# Patient Record
Sex: MALE | Race: WHITE | NOT HISPANIC OR LATINO | URBAN - METROPOLITAN AREA
[De-identification: names, ages, dates, MRNs, and addresses within clinical notes are randomized per-mention and may not be internally consistent; named-entity substitution may affect disease eponyms.]

---

## 2017-03-01 ENCOUNTER — INPATIENT (INPATIENT)
Facility: HOSPITAL | Age: 62
LOS: 5 days | Discharge: NURSING, REHAB OR LONG-TERM CARE | DRG: 470 | End: 2017-03-07
Payer: COMMERCIAL

## 2017-03-01 VITALS
SYSTOLIC BLOOD PRESSURE: 136 MMHG | WEIGHT: 253.31 LBS | OXYGEN SATURATION: 99 % | HEART RATE: 67 BPM | DIASTOLIC BLOOD PRESSURE: 70 MMHG | RESPIRATION RATE: 16 BRPM | TEMPERATURE: 98 F | HEIGHT: 71 IN

## 2017-03-01 DIAGNOSIS — S89.90XA UNSPECIFIED INJURY OF UNSPECIFIED LOWER LEG, INITIAL ENCOUNTER: Chronic | ICD-10-CM

## 2017-03-01 DIAGNOSIS — Z90.89 ACQUIRED ABSENCE OF OTHER ORGANS: Chronic | ICD-10-CM

## 2017-03-01 DIAGNOSIS — Z98.890 OTHER SPECIFIED POSTPROCEDURAL STATES: Chronic | ICD-10-CM

## 2017-03-01 DIAGNOSIS — M25.319 OTHER INSTABILITY, UNSPECIFIED SHOULDER: Chronic | ICD-10-CM

## 2017-03-01 DIAGNOSIS — M25.839 OTHER SPECIFIED JOINT DISORDERS, UNSPECIFIED WRIST: Chronic | ICD-10-CM

## 2017-03-01 DIAGNOSIS — M19.90 UNSPECIFIED OSTEOARTHRITIS, UNSPECIFIED SITE: ICD-10-CM

## 2017-03-01 PROCEDURE — 73560 X-RAY EXAM OF KNEE 1 OR 2: CPT | Mod: 26,RT

## 2017-03-01 RX ORDER — MAGNESIUM HYDROXIDE 400 MG/1
30 TABLET, CHEWABLE ORAL DAILY
Qty: 0 | Refills: 0 | Status: DISCONTINUED | OUTPATIENT
Start: 2017-03-01 | End: 2017-03-07

## 2017-03-01 RX ORDER — NEBIVOLOL HYDROCHLORIDE 5 MG/1
1 TABLET ORAL
Qty: 0 | Refills: 0 | COMMUNITY

## 2017-03-01 RX ORDER — SENNA PLUS 8.6 MG/1
2 TABLET ORAL AT BEDTIME
Qty: 0 | Refills: 0 | Status: DISCONTINUED | OUTPATIENT
Start: 2017-03-01 | End: 2017-03-07

## 2017-03-01 RX ORDER — BUPIVACAINE 13.3 MG/ML
20 INJECTION, SUSPENSION, LIPOSOMAL INFILTRATION ONCE
Qty: 0 | Refills: 0 | Status: DISCONTINUED | OUTPATIENT
Start: 2017-03-01 | End: 2017-03-01

## 2017-03-01 RX ORDER — NEBIVOLOL HYDROCHLORIDE 5 MG/1
5 TABLET ORAL DAILY
Qty: 0 | Refills: 0 | Status: DISCONTINUED | OUTPATIENT
Start: 2017-03-01 | End: 2017-03-05

## 2017-03-01 RX ORDER — OXYCODONE HYDROCHLORIDE 5 MG/1
10 TABLET ORAL EVERY 4 HOURS
Qty: 0 | Refills: 0 | Status: DISCONTINUED | OUTPATIENT
Start: 2017-03-01 | End: 2017-03-03

## 2017-03-01 RX ORDER — OXYCODONE HYDROCHLORIDE 5 MG/1
5 TABLET ORAL EVERY 4 HOURS
Qty: 0 | Refills: 0 | Status: DISCONTINUED | OUTPATIENT
Start: 2017-03-01 | End: 2017-03-03

## 2017-03-01 RX ORDER — BUPIVACAINE 13.3 MG/ML
20 INJECTION, SUSPENSION, LIPOSOMAL INFILTRATION ONCE
Qty: 0 | Refills: 0 | Status: DISCONTINUED | OUTPATIENT
Start: 2017-03-01 | End: 2017-03-04

## 2017-03-01 RX ORDER — ONDANSETRON 8 MG/1
4 TABLET, FILM COATED ORAL EVERY 6 HOURS
Qty: 0 | Refills: 0 | Status: DISCONTINUED | OUTPATIENT
Start: 2017-03-01 | End: 2017-03-06

## 2017-03-01 RX ORDER — MORPHINE SULFATE 50 MG/1
4 CAPSULE, EXTENDED RELEASE ORAL
Qty: 0 | Refills: 0 | Status: DISCONTINUED | OUTPATIENT
Start: 2017-03-01 | End: 2017-03-06

## 2017-03-01 RX ORDER — DEXLANSOPRAZOLE 30 MG/1
1 CAPSULE, DELAYED RELEASE ORAL
Qty: 0 | Refills: 0 | COMMUNITY

## 2017-03-01 RX ORDER — ASPIRIN/CALCIUM CARB/MAGNESIUM 324 MG
325 TABLET ORAL
Qty: 0 | Refills: 0 | Status: DISCONTINUED | OUTPATIENT
Start: 2017-03-01 | End: 2017-03-07

## 2017-03-01 RX ORDER — OXYCODONE HYDROCHLORIDE 5 MG/1
10 TABLET ORAL EVERY 12 HOURS
Qty: 0 | Refills: 0 | Status: DISCONTINUED | OUTPATIENT
Start: 2017-03-01 | End: 2017-03-02

## 2017-03-01 RX ORDER — ACETAMINOPHEN 500 MG
650 TABLET ORAL EVERY 6 HOURS
Qty: 0 | Refills: 0 | Status: DISCONTINUED | OUTPATIENT
Start: 2017-03-01 | End: 2017-03-06

## 2017-03-01 RX ORDER — POLYETHYLENE GLYCOL 3350 17 G/17G
17 POWDER, FOR SOLUTION ORAL DAILY
Qty: 0 | Refills: 0 | Status: DISCONTINUED | OUTPATIENT
Start: 2017-03-01 | End: 2017-03-07

## 2017-03-01 RX ORDER — CELECOXIB 200 MG/1
200 CAPSULE ORAL
Qty: 0 | Refills: 0 | Status: DISCONTINUED | OUTPATIENT
Start: 2017-03-03 | End: 2017-03-06

## 2017-03-01 RX ORDER — MORPHINE SULFATE 50 MG/1
4 CAPSULE, EXTENDED RELEASE ORAL EVERY 4 HOURS
Qty: 0 | Refills: 0 | Status: DISCONTINUED | OUTPATIENT
Start: 2017-03-01 | End: 2017-03-06

## 2017-03-01 RX ORDER — SODIUM CHLORIDE 9 MG/ML
1000 INJECTION, SOLUTION INTRAVENOUS
Qty: 0 | Refills: 0 | Status: DISCONTINUED | OUTPATIENT
Start: 2017-03-01 | End: 2017-03-06

## 2017-03-01 RX ORDER — KETOROLAC TROMETHAMINE 30 MG/ML
30 SYRINGE (ML) INJECTION EVERY 6 HOURS
Qty: 0 | Refills: 0 | Status: DISCONTINUED | OUTPATIENT
Start: 2017-03-01 | End: 2017-03-01

## 2017-03-01 RX ORDER — CEFAZOLIN SODIUM 1 G
2000 VIAL (EA) INJECTION EVERY 8 HOURS
Qty: 0 | Refills: 0 | Status: COMPLETED | OUTPATIENT
Start: 2017-03-01 | End: 2017-03-02

## 2017-03-01 RX ORDER — DOCUSATE SODIUM 100 MG
100 CAPSULE ORAL THREE TIMES A DAY
Qty: 0 | Refills: 0 | Status: DISCONTINUED | OUTPATIENT
Start: 2017-03-01 | End: 2017-03-07

## 2017-03-01 RX ADMIN — Medication 100 MILLIGRAM(S): at 18:02

## 2017-03-01 RX ADMIN — OXYCODONE HYDROCHLORIDE 10 MILLIGRAM(S): 5 TABLET ORAL at 20:00

## 2017-03-01 RX ADMIN — OXYCODONE HYDROCHLORIDE 10 MILLIGRAM(S): 5 TABLET ORAL at 15:18

## 2017-03-01 RX ADMIN — OXYCODONE HYDROCHLORIDE 10 MILLIGRAM(S): 5 TABLET ORAL at 17:59

## 2017-03-01 RX ADMIN — OXYCODONE HYDROCHLORIDE 10 MILLIGRAM(S): 5 TABLET ORAL at 23:54

## 2017-03-01 RX ADMIN — OXYCODONE HYDROCHLORIDE 10 MILLIGRAM(S): 5 TABLET ORAL at 23:24

## 2017-03-01 RX ADMIN — SODIUM CHLORIDE 140 MILLILITER(S): 9 INJECTION, SOLUTION INTRAVENOUS at 14:56

## 2017-03-01 RX ADMIN — MORPHINE SULFATE 4 MILLIGRAM(S): 50 CAPSULE, EXTENDED RELEASE ORAL at 17:00

## 2017-03-01 RX ADMIN — Medication 30 MILLIGRAM(S): at 23:54

## 2017-03-01 RX ADMIN — Medication 30 MILLIGRAM(S): at 17:57

## 2017-03-01 RX ADMIN — NEBIVOLOL HYDROCHLORIDE 5 MILLIGRAM(S): 5 TABLET ORAL at 21:58

## 2017-03-01 RX ADMIN — MORPHINE SULFATE 4 MILLIGRAM(S): 50 CAPSULE, EXTENDED RELEASE ORAL at 16:27

## 2017-03-01 RX ADMIN — OXYCODONE HYDROCHLORIDE 10 MILLIGRAM(S): 5 TABLET ORAL at 16:18

## 2017-03-01 RX ADMIN — OXYCODONE HYDROCHLORIDE 10 MILLIGRAM(S): 5 TABLET ORAL at 18:25

## 2017-03-01 RX ADMIN — Medication 100 MILLIGRAM(S): at 17:58

## 2017-03-01 RX ADMIN — Medication 325 MILLIGRAM(S): at 17:57

## 2017-03-01 RX ADMIN — Medication 30 MILLIGRAM(S): at 18:25

## 2017-03-01 RX ADMIN — POLYETHYLENE GLYCOL 3350 17 GRAM(S): 17 POWDER, FOR SOLUTION ORAL at 17:57

## 2017-03-01 RX ADMIN — Medication 30 MILLIGRAM(S): at 23:24

## 2017-03-01 RX ADMIN — OXYCODONE HYDROCHLORIDE 10 MILLIGRAM(S): 5 TABLET ORAL at 19:28

## 2017-03-01 NOTE — DISCHARGE NOTE ADULT - PATIENT PORTAL LINK FT
“You can access the [employer] Patient Portal, offered by [facility], by registering with the following website: [email or web address]”

## 2017-03-01 NOTE — DISCHARGE NOTE ADULT - ADDITIONAL INSTRUCTIONS
No strenuous activity, heavy lifting, driving, tub bathing, or returning to work until cleared by MD.  You may shower--dressing is waterproof.  Remove dressing after post op day 7, then leave incision open to air.  Follow up with Dr. Beckman in his office in 2 weeks from surgery.  Any staples/sutures will be removed in 2 weeks.  If you don't have a bowel movement by post op day 3, then take Milk of Magnesia (over the counter).  If no bowel movement by at least post op day 5, then use a Dulcolax suppository (over the counter) and/or a Fleets enema--if still no bowel movement, call your MD.  Contact your doctor if you experience: fever greater than 101.5, chills, chest pain, difficulty breathing, bleeding, redness or heat around the incision.    Please follow up with your primary care provider. Weight bearing as tolerated on right leg with assistive device.  No strenuous activity, heavy lifting, driving, tub bathing, or returning to work until cleared by MD.  You may shower--dressing is waterproof.  Remove dressing after post op day 7, then leave incision open to air.  Follow up with Dr. Beckman in his office in 2 weeks from surgery.  Any staples/sutures will be removed in 2 weeks.  If you don't have a bowel movement by post op day 3, then take Milk of Magnesia (over the counter).  If no bowel movement by at least post op day 5, then use a Dulcolax suppository (over the counter) and/or a Fleets enema--if still no bowel movement, call your MD.  Contact your doctor if you experience: fever greater than 101.5, chills, chest pain, difficulty breathing, bleeding, redness or heat around the incision.    Please follow up with your primary care provider. Weight bearing as tolerated on right leg with assistive device.  No strenuous activity, heavy lifting, driving, tub bathing, or returning to work until cleared by MD.  You may shower--dressing is waterproof.  Remove dressing after post op day 7, then leave incision open to air.  Follow up with Dr. Beckman in his office in 2 weeks from surgery.  Any staples/sutures will be removed in 2 weeks.  If you don't have a bowel movement by post op day 3, then take Milk of Magnesia (over the counter).  If no bowel movement by at least post op day 5, then use a Dulcolax suppository (over the counter) and/or a Fleets enema--if still no bowel movement, call your MD.  Contact your doctor if you experience: fever greater than 101.5, chills, chest pain, difficulty breathing, bleeding, redness or heat around the incision.    Please follow up with your primary care provider.  Follow up with your cardiologist for blood pressure management within one week of discharge.

## 2017-03-01 NOTE — DISCHARGE NOTE ADULT - HOSPITAL COURSE
Admitted  Surgery - R TKA 3/1/17  Perioperative abx  Pain control  DVT ppx  PT consult Admitted 3/1/17  Surgery - R TKA 3/1/17  Perioperative abx  Pain control  DVT ppx  PT consult

## 2017-03-01 NOTE — H&P ADULT - NSHPLABSRESULTS_GEN_ALL_CORE
Preop CBC, BMP, PT/PTT/INR, UA within normal limits- reviewed by medical clearance.   CXR: Within normal limits, per medical clearance.   Preop EKG: SR first degree AV block, reviewed by medical clearance.

## 2017-03-01 NOTE — PATIENT PROFILE ADULT. - PMH
Acid reflux disease    Hypertension    Polymyalgia rheumatica syndrome Acid reflux disease    Hypertension    Involuntary movements  tremors of hands.  Polymyalgia rheumatica syndrome

## 2017-03-01 NOTE — PROGRESS NOTE ADULT - SUBJECTIVE AND OBJECTIVE BOX
Orthopedics Post Op Check    Procedure: R TKA  Surgeon: Rk GARCIAS: Patient seen and examined. Pain reported but controlled. No acute concerns.  Denies CP, SOB, N/V, numbness/tingling      O: AFVSS  Motor: 5/5 GS/TA/EHL/FHL BL   SILT to patients baseline BL  WWP DP PT BL  Dressing C/D/I     Radiograph: Prosthesis in place, well positioned. No osseous defect evident.

## 2017-03-01 NOTE — DISCHARGE NOTE ADULT - CARE PROVIDER_API CALL
Samuel Beckman), Orthopaedic Surgery  [address] [address]  Phone: [phone]  Fax: [phone] Sameul Beckman), Orthopaedic Surgery  [address] [place] [address]  Phone: [phone]  Fax: [phone]    Bessie Lees), Internal Medicine  [address] [place] [address]  Phone: [phone]  Fax: [phone]    Kait Powell), Critical Care Medicine; Pulmonary Disease  [address] [place] [address]  Phone: [phone]  Fax: [phone]

## 2017-03-01 NOTE — PATIENT PROFILE ADULT. - PSH
H/O arthroscopy of right knee    History of tonsillectomy    Knee injuries  right knee microfracture  Shoulder instability  belateral shoulder stabalization  Wrist impingement syndrome  right vein released

## 2017-03-01 NOTE — H&P ADULT - PROBLEM SELECTOR PLAN 1
Admit to Orthopaedic Service.  Presents today for elective right TKA  Pt medically stable and cleared for procedure today by Dr. Thomas

## 2017-03-01 NOTE — PROGRESS NOTE ADULT - ASSESSMENT
A/P: 61y Male s/p R TKA  - Stable  - Pain Control  - DVT ppx: ASA  - Post op abx:  - PT/WBAT  - F/U Labs

## 2017-03-01 NOTE — DISCHARGE NOTE ADULT - MEDICATION SUMMARY - MEDICATIONS TO TAKE
I will START or STAY ON the medications listed below when I get home from the hospital:    predniSONE 1 mg oral tablet  -- 2 tab(s) by mouth once a day  -- Indication: For PMR    acetaminophen 325 mg oral tablet  -- 3 tab(s) by mouth every 8 hours, As needed, Mild Pain (1 - 3)  -- Indication: For mild pain    aspirin 325 mg oral delayed release tablet  -- 1 tab(s) by mouth 2 times a day  -- Indication: For Prevent blood clots    acetaminophen-HYDROcodone 325 mg-5 mg oral tablet  -- 1 tab(s) by mouth every 6 hours, As needed, Moderate Pain (4 - 6)  -- Indication: For moderate to severe pain    ondansetron 4 mg oral tablet  -- 1 tab(s) by mouth every 8 hours  -- Indication: For nausea, as needed    scopolamine 1.5 mg transdermal film, extended release  -- 1 patch by transdermal patch every 72 hours  -- For external use only.  May cause drowsiness.  Alcohol may intensify this effect.  Use care when operating dangerous machinery.  Remove old patch prior to applying a new patch.    -- Indication: For nausea, as needed    Bystolic 5 mg oral tablet  -- 1 tab(s) by mouth once a day  -- Indication: For Hypertension    bisacodyl 10 mg rectal suppository  -- 1 suppository(ies) rectally once a day, As needed, If no bowel movement by postoperative day #2  -- Indication: For constipation    docusate sodium 100 mg oral capsule  -- 1 cap(s) by mouth 3 times a day  -- Indication: For constipation    polyethylene glycol 3350 oral powder for reconstitution  -- 17 gram(s) by mouth once a day  -- Indication: For constipation    senna oral tablet  -- 2 tab(s) by mouth once a day (at bedtime), As needed, Constipation  -- Indication: For constipation    Dexilant 60 mg oral delayed release capsule  -- 1 cap(s) by mouth once a day  -- Indication: For Home gastrointestinal agent

## 2017-03-01 NOTE — PHYSICAL THERAPY INITIAL EVALUATION ADULT - RANGE OF MOTION EXAMINATION, REHAB EVAL
deficits as listed below/bilateral upper extremity ROM was WFL (within functional limits)/Left LE ROM was WFL (within functional limits)/Right knee ROM: 0-75 limited by pain

## 2017-03-01 NOTE — H&P ADULT - PMH
Acid reflux disease    Hypertension    Involuntary movements  tremors of hands.  Polymyalgia rheumatica syndrome

## 2017-03-01 NOTE — DISCHARGE NOTE ADULT - CARE PROVIDERS DIRECT ADDRESSES
,DirectAddress_Unknown,DirectAddress_Unknown ,DirectAddress_Unknown,[email or web address],DirectAddress_Unknown,DirectAddress_Unknown
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
## 2017-03-01 NOTE — DISCHARGE NOTE ADULT - MEDICATION SUMMARY - MEDICATIONS TO CHANGE
I will SWITCH the dose or number of times a day I take the medications listed below when I get home from the hospital:    Bystolic 5 mg oral tablet  -- 1 tab(s) by mouth once a day    aspirin 81 mg oral tablet  -- 1 tab(s) by mouth once a day

## 2017-03-01 NOTE — DISCHARGE NOTE ADULT - PLAN OF CARE
improvement after surgery see below Improved ambulation and decreased pain after right total knee replacement 3/1/17

## 2017-03-01 NOTE — DISCHARGE NOTE ADULT - CARE PLAN
Principal Discharge DX:	Arthritis  Goal:	improvement after surgery  Instructions for follow-up, activity and diet:	see below Principal Discharge DX:	Arthritis  Goal:	Improved ambulation and decreased pain after right total knee replacement 3/1/17  Instructions for follow-up, activity and diet:	see below

## 2017-03-01 NOTE — H&P ADULT - NSHPPHYSICALEXAM_GEN_ALL_CORE
PE: Normal head, atraumatic. Normal skin, no rashes/lesions/erythema.    MSK: No deformity or open wounds. No rashes or lesions. EHL/TA/GS/FHL 5/5 BLE. Sensation intact and equal BLE. Skin warm and well perfused. DP palpable BLE. Cap refill brisk.    Rest of PE per medical clearance.

## 2017-03-02 LAB
ANION GAP SERPL CALC-SCNC: 9 MMOL/L — SIGNIFICANT CHANGE UP (ref 9–16)
BUN SERPL-MCNC: 15 MG/DL — SIGNIFICANT CHANGE UP (ref 7–23)
CALCIUM SERPL-MCNC: 8.2 MG/DL — LOW (ref 8.5–10.5)
CHLORIDE SERPL-SCNC: 106 MMOL/L — SIGNIFICANT CHANGE UP (ref 96–108)
CO2 SERPL-SCNC: 24 MMOL/L — SIGNIFICANT CHANGE UP (ref 22–31)
CREAT SERPL-MCNC: 0.76 MG/DL — SIGNIFICANT CHANGE UP (ref 0.5–1.3)
GLUCOSE SERPL-MCNC: 117 MG/DL — HIGH (ref 70–99)
HCT VFR BLD CALC: 36.7 % — LOW (ref 39–50)
HGB BLD-MCNC: 13 G/DL — SIGNIFICANT CHANGE UP (ref 13–17)
MCHC RBC-ENTMCNC: 31.4 PG — SIGNIFICANT CHANGE UP (ref 27–34)
MCHC RBC-ENTMCNC: 35.4 G/DL — SIGNIFICANT CHANGE UP (ref 32–36)
MCV RBC AUTO: 88.6 FL — SIGNIFICANT CHANGE UP (ref 80–100)
MRSA PCR RESULT.: NEGATIVE — SIGNIFICANT CHANGE UP
PLATELET # BLD AUTO: 168 K/UL — SIGNIFICANT CHANGE UP (ref 150–400)
POTASSIUM SERPL-MCNC: 3.9 MMOL/L — SIGNIFICANT CHANGE UP (ref 3.5–5.3)
POTASSIUM SERPL-SCNC: 3.9 MMOL/L — SIGNIFICANT CHANGE UP (ref 3.5–5.3)
RBC # BLD: 4.14 M/UL — LOW (ref 4.2–5.8)
RBC # FLD: 12.5 % — SIGNIFICANT CHANGE UP (ref 10.3–16.9)
S AUREUS DNA NOSE QL NAA+PROBE: NEGATIVE — SIGNIFICANT CHANGE UP
SODIUM SERPL-SCNC: 139 MMOL/L — SIGNIFICANT CHANGE UP (ref 135–145)
SURGICAL PATHOLOGY STUDY: SIGNIFICANT CHANGE UP
WBC # BLD: 18 K/UL — HIGH (ref 3.8–10.5)
WBC # FLD AUTO: 18 K/UL — HIGH (ref 3.8–10.5)

## 2017-03-02 RX ORDER — KETOROLAC TROMETHAMINE 30 MG/ML
15 SYRINGE (ML) INJECTION EVERY 6 HOURS
Qty: 0 | Refills: 0 | Status: DISCONTINUED | OUTPATIENT
Start: 2017-03-02 | End: 2017-03-02

## 2017-03-02 RX ORDER — SCOPALAMINE 1 MG/3D
1.5 PATCH, EXTENDED RELEASE TRANSDERMAL ONCE
Qty: 0 | Refills: 0 | Status: COMPLETED | OUTPATIENT
Start: 2017-03-02 | End: 2017-03-02

## 2017-03-02 RX ORDER — METOCLOPRAMIDE HCL 10 MG
10 TABLET ORAL EVERY 6 HOURS
Qty: 0 | Refills: 0 | Status: DISCONTINUED | OUTPATIENT
Start: 2017-03-02 | End: 2017-03-06

## 2017-03-02 RX ORDER — METOCLOPRAMIDE HCL 10 MG
10 TABLET ORAL EVERY 6 HOURS
Qty: 0 | Refills: 0 | Status: DISCONTINUED | OUTPATIENT
Start: 2017-03-02 | End: 2017-03-02

## 2017-03-02 RX ORDER — KETOROLAC TROMETHAMINE 30 MG/ML
15 SYRINGE (ML) INJECTION EVERY 6 HOURS
Qty: 0 | Refills: 0 | Status: DISCONTINUED | OUTPATIENT
Start: 2017-03-02 | End: 2017-03-03

## 2017-03-02 RX ADMIN — OXYCODONE HYDROCHLORIDE 10 MILLIGRAM(S): 5 TABLET ORAL at 05:19

## 2017-03-02 RX ADMIN — Medication 15 MILLIGRAM(S): at 22:31

## 2017-03-02 RX ADMIN — ONDANSETRON 4 MILLIGRAM(S): 8 TABLET, FILM COATED ORAL at 06:59

## 2017-03-02 RX ADMIN — Medication 100 MILLIGRAM(S): at 05:19

## 2017-03-02 RX ADMIN — POLYETHYLENE GLYCOL 3350 17 GRAM(S): 17 POWDER, FOR SOLUTION ORAL at 12:45

## 2017-03-02 RX ADMIN — Medication 100 MILLIGRAM(S): at 12:45

## 2017-03-02 RX ADMIN — Medication 325 MILLIGRAM(S): at 17:58

## 2017-03-02 RX ADMIN — Medication 100 MILLIGRAM(S): at 21:08

## 2017-03-02 RX ADMIN — ONDANSETRON 4 MILLIGRAM(S): 8 TABLET, FILM COATED ORAL at 00:28

## 2017-03-02 RX ADMIN — NEBIVOLOL HYDROCHLORIDE 5 MILLIGRAM(S): 5 TABLET ORAL at 21:08

## 2017-03-02 RX ADMIN — Medication 100 MILLIGRAM(S): at 02:59

## 2017-03-02 RX ADMIN — Medication 325 MILLIGRAM(S): at 05:19

## 2017-03-02 RX ADMIN — OXYCODONE HYDROCHLORIDE 10 MILLIGRAM(S): 5 TABLET ORAL at 05:49

## 2017-03-02 RX ADMIN — Medication 15 MILLIGRAM(S): at 22:46

## 2017-03-02 RX ADMIN — SCOPALAMINE 1.5 MILLIGRAM(S): 1 PATCH, EXTENDED RELEASE TRANSDERMAL at 09:45

## 2017-03-02 RX ADMIN — Medication 10 MILLIGRAM(S): at 09:52

## 2017-03-02 NOTE — PROGRESS NOTE ADULT - SUBJECTIVE AND OBJECTIVE BOX
SUBJECTIVE: Patient seen and examined. Pain controlled.  Pt did well o/n  No f/c/n/v/cp/sob.     OBJECTIVE:      Affected extremity:          Dressing: clean/dry/intact            Sensation: SILT         Motor exam: 5/5 TA/GS/EHL         warm well perfused; capillary refill <3 seconds     A/P :  Pt is a 60yo Male s/p  R TKA  -    Pain control  -    DVT ppx: ASA     -    Weight bearing status: WBAT   -    Physical Therapy  -    Dispo: [facility]

## 2017-03-02 NOTE — PROGRESS NOTE ADULT - SUBJECTIVE AND OBJECTIVE BOX
POST OPERATIVE DAY #: 1  STATUS POST:  Right  TKA                       SUBJECTIVE: Patient seen and examined.     Pain:  well controlled    OBJECTIVE:     Vital Signs Last 24 Hrs  T(C): 36.6, Max: 37.1 (03-01 @ 20:59)  T(F): 97.8, Max: 98.7 (03-01 @ 20:59)  HR: 72 (66 - 80)  BP: 152/78 (117/61 - 152/78)  BP(mean): --  RR: 16 (16 - 16)  SpO2: 98% (95% - 98%)    Affected extremity:          Dressing: clean/dry/intact          Sensation: intact to light touch          Motor exam:  5 / 5 Tibialis Anterior/Gastrocnemius-Soleus          warm, well-perfused; capillary refill < 3 seconds              I&O's Detail  I & Os for 24h ending 02 Mar 2017 07:00  =============================================  IN:    Other: 1200 ml    lactated ringers.: 980 ml    IV PiggyBack: 50 ml    Total IN: 2230 ml  ---------------------------------------------  OUT:    Voided: 1325 ml    Total OUT: 1325 ml  ---------------------------------------------  Total NET: 905 ml    I & Os for current day (as of 02 Mar 2017 12:28)  =============================================  IN:    Oral Fluid: 420 ml    Total IN: 420 ml  ---------------------------------------------  OUT:    Voided: 550 ml    Total OUT: 550 ml  ---------------------------------------------  Total NET: -130 ml      LABS:                        13.0   18.0  )-----------( 168      ( 02 Mar 2017 07:13 )             36.7     02 Mar 2017 07:13    139    |  106    |  15     ----------------------------<  117    3.9     |  24     |  0.76     Ca    8.2        02 Mar 2017 07:13            MEDICATIONS:    acetaminophen   Tablet 650milliGRAM(s) Oral every 6 hours PRN  oxyCODONE IR 5milliGRAM(s) Oral every 4 hours PRN  oxyCODONE IR 10milliGRAM(s) Oral every 4 hours PRN  morphine  - Injectable 4milliGRAM(s) IV Push every 4 hours PRN  morphine  - Injectable 4milliGRAM(s) IV Push every 15 minutes  ondansetron Injectable 4milliGRAM(s) IV Push every 6 hours PRN  ketorolac   Injectable 15milliGRAM(s) IV Push every 6 hours PRN  metoclopramide Injectable 10milliGRAM(s) IV Push every 6 hours PRN    aspirin enteric coated 325milliGRAM(s) Oral two times a day      RADIOLOGY & ADDITIONAL STUDIES:    ASSESSMENT AND PLAN:     1. Analgesic pain control  2. DVT prophylaxis: ASA   3. Antibiotics: post op abx completed  4. Weight Bearing Status:  Weight bearing as tolerated  5. Disposition:  Subacute Rehab

## 2017-03-03 LAB
HCT VFR BLD CALC: 38.6 % — LOW (ref 39–50)
HGB BLD-MCNC: 13.3 G/DL — SIGNIFICANT CHANGE UP (ref 13–17)
MCHC RBC-ENTMCNC: 31 PG — SIGNIFICANT CHANGE UP (ref 27–34)
MCHC RBC-ENTMCNC: 34.5 G/DL — SIGNIFICANT CHANGE UP (ref 32–36)
MCV RBC AUTO: 90 FL — SIGNIFICANT CHANGE UP (ref 80–100)
PLATELET # BLD AUTO: 165 K/UL — SIGNIFICANT CHANGE UP (ref 150–400)
RBC # BLD: 4.29 M/UL — SIGNIFICANT CHANGE UP (ref 4.2–5.8)
RBC # FLD: 12.5 % — SIGNIFICANT CHANGE UP (ref 10.3–16.9)
WBC # BLD: 11.5 K/UL — HIGH (ref 3.8–10.5)
WBC # FLD AUTO: 11.5 K/UL — HIGH (ref 3.8–10.5)

## 2017-03-03 RX ORDER — TRAMADOL HYDROCHLORIDE 50 MG/1
50 TABLET ORAL EVERY 4 HOURS
Qty: 0 | Refills: 0 | Status: DISCONTINUED | OUTPATIENT
Start: 2017-03-03 | End: 2017-03-03

## 2017-03-03 RX ORDER — TRAMADOL HYDROCHLORIDE 50 MG/1
50 TABLET ORAL EVERY 8 HOURS
Qty: 0 | Refills: 0 | Status: DISCONTINUED | OUTPATIENT
Start: 2017-03-03 | End: 2017-03-03

## 2017-03-03 RX ORDER — OXYCODONE HYDROCHLORIDE 5 MG/1
1 TABLET ORAL
Qty: 0 | Refills: 0 | COMMUNITY
Start: 2017-03-03

## 2017-03-03 RX ORDER — HYDRALAZINE HCL 50 MG
10 TABLET ORAL ONCE
Qty: 0 | Refills: 0 | Status: COMPLETED | OUTPATIENT
Start: 2017-03-03 | End: 2017-03-03

## 2017-03-03 RX ORDER — ASPIRIN/CALCIUM CARB/MAGNESIUM 324 MG
1 TABLET ORAL
Qty: 0 | Refills: 0 | COMMUNITY
Start: 2017-03-03

## 2017-03-03 RX ORDER — LACTULOSE 10 G/15ML
10 SOLUTION ORAL ONCE
Qty: 0 | Refills: 0 | Status: COMPLETED | OUTPATIENT
Start: 2017-03-03 | End: 2017-03-05

## 2017-03-03 RX ORDER — TRAMADOL HYDROCHLORIDE 50 MG/1
1 TABLET ORAL
Qty: 0 | Refills: 0 | COMMUNITY
Start: 2017-03-03

## 2017-03-03 RX ORDER — ASPIRIN/CALCIUM CARB/MAGNESIUM 324 MG
1 TABLET ORAL
Qty: 0 | Refills: 0 | COMMUNITY

## 2017-03-03 RX ORDER — HYDROMORPHONE HYDROCHLORIDE 2 MG/ML
2 INJECTION INTRAMUSCULAR; INTRAVENOUS; SUBCUTANEOUS EVERY 4 HOURS
Qty: 0 | Refills: 0 | Status: DISCONTINUED | OUTPATIENT
Start: 2017-03-03 | End: 2017-03-06

## 2017-03-03 RX ORDER — CELECOXIB 200 MG/1
1 CAPSULE ORAL
Qty: 0 | Refills: 0 | COMMUNITY
Start: 2017-03-03

## 2017-03-03 RX ORDER — TRAMADOL HYDROCHLORIDE 50 MG/1
100 TABLET ORAL EVERY 4 HOURS
Qty: 0 | Refills: 0 | Status: DISCONTINUED | OUTPATIENT
Start: 2017-03-03 | End: 2017-03-03

## 2017-03-03 RX ADMIN — CELECOXIB 200 MILLIGRAM(S): 200 CAPSULE ORAL at 07:02

## 2017-03-03 RX ADMIN — Medication 100 MILLIGRAM(S): at 05:33

## 2017-03-03 RX ADMIN — POLYETHYLENE GLYCOL 3350 17 GRAM(S): 17 POWDER, FOR SOLUTION ORAL at 08:47

## 2017-03-03 RX ADMIN — Medication 15 MILLIGRAM(S): at 13:21

## 2017-03-03 RX ADMIN — TRAMADOL HYDROCHLORIDE 50 MILLIGRAM(S): 50 TABLET ORAL at 20:40

## 2017-03-03 RX ADMIN — Medication 100 MILLIGRAM(S): at 21:00

## 2017-03-03 RX ADMIN — CELECOXIB 200 MILLIGRAM(S): 200 CAPSULE ORAL at 16:13

## 2017-03-03 RX ADMIN — TRAMADOL HYDROCHLORIDE 50 MILLIGRAM(S): 50 TABLET ORAL at 16:11

## 2017-03-03 RX ADMIN — Medication 15 MILLIGRAM(S): at 07:28

## 2017-03-03 RX ADMIN — Medication 15 MILLIGRAM(S): at 13:35

## 2017-03-03 RX ADMIN — Medication 15 MILLIGRAM(S): at 07:45

## 2017-03-03 RX ADMIN — Medication 650 MILLIGRAM(S): at 08:47

## 2017-03-03 RX ADMIN — TRAMADOL HYDROCHLORIDE 50 MILLIGRAM(S): 50 TABLET ORAL at 17:00

## 2017-03-03 RX ADMIN — Medication 100 MILLIGRAM(S): at 13:21

## 2017-03-03 RX ADMIN — CELECOXIB 200 MILLIGRAM(S): 200 CAPSULE ORAL at 17:15

## 2017-03-03 RX ADMIN — TRAMADOL HYDROCHLORIDE 50 MILLIGRAM(S): 50 TABLET ORAL at 21:19

## 2017-03-03 RX ADMIN — Medication 10 MILLIGRAM(S): at 21:52

## 2017-03-03 RX ADMIN — Medication 325 MILLIGRAM(S): at 05:32

## 2017-03-03 RX ADMIN — TRAMADOL HYDROCHLORIDE 50 MILLIGRAM(S): 50 TABLET ORAL at 22:00

## 2017-03-03 RX ADMIN — HYDROMORPHONE HYDROCHLORIDE 2 MILLIGRAM(S): 2 INJECTION INTRAMUSCULAR; INTRAVENOUS; SUBCUTANEOUS at 23:16

## 2017-03-03 RX ADMIN — NEBIVOLOL HYDROCHLORIDE 5 MILLIGRAM(S): 5 TABLET ORAL at 21:00

## 2017-03-03 RX ADMIN — CELECOXIB 200 MILLIGRAM(S): 200 CAPSULE ORAL at 06:02

## 2017-03-03 RX ADMIN — ONDANSETRON 4 MILLIGRAM(S): 8 TABLET, FILM COATED ORAL at 23:14

## 2017-03-03 RX ADMIN — Medication 325 MILLIGRAM(S): at 16:13

## 2017-03-03 RX ADMIN — TRAMADOL HYDROCHLORIDE 50 MILLIGRAM(S): 50 TABLET ORAL at 20:01

## 2017-03-03 NOTE — PROGRESS NOTE ADULT - SUBJECTIVE AND OBJECTIVE BOX
SUBJECTIVE: Patient seen and examined. Pain controlled.  Pt did well o/n  No f/c/n/v/cp/sob.     OBJECTIVE:      Affected extremity:          Dressing: clean/dry/intact            Sensation: SILT         Motor exam: 5/5 TA/GS/EHL         warm well perfused; capillary refill <3 seconds     A/P :  Pt is a 62yo Male s/p  R TKA  -    Pain control  -    DVT ppx: ASA     -    Weight bearing status: WBAT   -    Physical Therapy  -    Dispo: [facility]

## 2017-03-03 NOTE — PROGRESS NOTE ADULT - SUBJECTIVE AND OBJECTIVE BOX
POST OPERATIVE DAY #: 2  STATUS POST: Right TKA                       SUBJECTIVE: Patient seen and examined.     Pain:  well controlled     OBJECTIVE:     Vital Signs Last 24 Hrs  T(C): 36.2, Max: 36.8 (03-02 @ 20:42)  T(F): 97.2, Max: 98.3 (03-03 @ 05:02)  HR: 90 (65 - 90)  BP: 132/82 (131/75 - 144/86)  BP(mean): --  RR: 14 (14 - 16)  SpO2: 99% (94% - 99%)    Affected extremity:          Dressing: clean/dry/intact          Sensation: intact to light touch          Motor exam:  5 / 5 Tibialis Anterior/Gastrocnemius-Soleus          warm, well-perfused; capillary refill < 3 seconds              I&O's Detail  I & Os for 24h ending 03 Mar 2017 07:00  =============================================  IN:    Oral Fluid: 1220 ml    Total IN: 1220 ml  ---------------------------------------------  OUT:    Voided: 5150 ml    Total OUT: 5150 ml  ---------------------------------------------  Total NET: -3930 ml    I & Os for current day (as of 03 Mar 2017 11:25)  =============================================  IN:    Oral Fluid: 460 ml    Total IN: 460 ml  ---------------------------------------------  OUT:    Voided: 350 ml    Total OUT: 350 ml  ---------------------------------------------  Total NET: 110 ml      LABS:                        13.0   18.0  )-----------( 168      ( 02 Mar 2017 07:13 )             36.7     02 Mar 2017 07:13    139    |  106    |  15     ----------------------------<  117    3.9     |  24     |  0.76     Ca    8.2        02 Mar 2017 07:13            MEDICATIONS:    acetaminophen   Tablet 650milliGRAM(s) Oral every 6 hours PRN  celecoxib 200milliGRAM(s) Oral two times a day before meals  oxyCODONE IR 5milliGRAM(s) Oral every 4 hours PRN  oxyCODONE IR 10milliGRAM(s) Oral every 4 hours PRN  morphine  - Injectable 4milliGRAM(s) IV Push every 4 hours PRN  morphine  - Injectable 4milliGRAM(s) IV Push every 15 minutes  ondansetron Injectable 4milliGRAM(s) IV Push every 6 hours PRN  ketorolac   Injectable 15milliGRAM(s) IV Push every 6 hours PRN  metoclopramide Injectable 10milliGRAM(s) IV Push every 6 hours PRN    aspirin enteric coated 325milliGRAM(s) Oral two times a day      RADIOLOGY & ADDITIONAL STUDIES:    ASSESSMENT AND PLAN:     1. Analgesic pain control  2. DVT prophylaxis: ASA   3. Antibiotics: post op abx completed  4. Weight Bearing Status:  Weight bearing as tolerated  5. Disposition:  Subacute Rehab

## 2017-03-04 DIAGNOSIS — M35.3 POLYMYALGIA RHEUMATICA: ICD-10-CM

## 2017-03-04 DIAGNOSIS — I10 ESSENTIAL (PRIMARY) HYPERTENSION: ICD-10-CM

## 2017-03-04 PROCEDURE — 99233 SBSQ HOSP IP/OBS HIGH 50: CPT | Mod: GC

## 2017-03-04 RX ORDER — HYDROMORPHONE HYDROCHLORIDE 2 MG/ML
4 INJECTION INTRAMUSCULAR; INTRAVENOUS; SUBCUTANEOUS EVERY 4 HOURS
Qty: 0 | Refills: 0 | Status: DISCONTINUED | OUTPATIENT
Start: 2017-03-04 | End: 2017-03-06

## 2017-03-04 RX ORDER — HYDRALAZINE HCL 50 MG
10 TABLET ORAL ONCE
Qty: 0 | Refills: 0 | Status: COMPLETED | OUTPATIENT
Start: 2017-03-04 | End: 2017-03-04

## 2017-03-04 RX ORDER — NEBIVOLOL HYDROCHLORIDE 5 MG/1
5 TABLET ORAL ONCE
Qty: 0 | Refills: 0 | Status: COMPLETED | OUTPATIENT
Start: 2017-03-04 | End: 2017-03-04

## 2017-03-04 RX ADMIN — HYDROMORPHONE HYDROCHLORIDE 4 MILLIGRAM(S): 2 INJECTION INTRAMUSCULAR; INTRAVENOUS; SUBCUTANEOUS at 10:44

## 2017-03-04 RX ADMIN — CELECOXIB 200 MILLIGRAM(S): 200 CAPSULE ORAL at 06:30

## 2017-03-04 RX ADMIN — Medication 325 MILLIGRAM(S): at 05:52

## 2017-03-04 RX ADMIN — POLYETHYLENE GLYCOL 3350 17 GRAM(S): 17 POWDER, FOR SOLUTION ORAL at 14:38

## 2017-03-04 RX ADMIN — HYDROMORPHONE HYDROCHLORIDE 4 MILLIGRAM(S): 2 INJECTION INTRAMUSCULAR; INTRAVENOUS; SUBCUTANEOUS at 14:38

## 2017-03-04 RX ADMIN — HYDROMORPHONE HYDROCHLORIDE 2 MILLIGRAM(S): 2 INJECTION INTRAMUSCULAR; INTRAVENOUS; SUBCUTANEOUS at 03:16

## 2017-03-04 RX ADMIN — HYDROMORPHONE HYDROCHLORIDE 4 MILLIGRAM(S): 2 INJECTION INTRAMUSCULAR; INTRAVENOUS; SUBCUTANEOUS at 23:07

## 2017-03-04 RX ADMIN — CELECOXIB 200 MILLIGRAM(S): 200 CAPSULE ORAL at 18:30

## 2017-03-04 RX ADMIN — NEBIVOLOL HYDROCHLORIDE 5 MILLIGRAM(S): 5 TABLET ORAL at 14:38

## 2017-03-04 RX ADMIN — Medication 100 MILLIGRAM(S): at 14:38

## 2017-03-04 RX ADMIN — NEBIVOLOL HYDROCHLORIDE 5 MILLIGRAM(S): 5 TABLET ORAL at 18:44

## 2017-03-04 RX ADMIN — Medication 100 MILLIGRAM(S): at 05:52

## 2017-03-04 RX ADMIN — HYDROMORPHONE HYDROCHLORIDE 2 MILLIGRAM(S): 2 INJECTION INTRAMUSCULAR; INTRAVENOUS; SUBCUTANEOUS at 07:40

## 2017-03-04 RX ADMIN — Medication 325 MILLIGRAM(S): at 17:52

## 2017-03-04 RX ADMIN — HYDROMORPHONE HYDROCHLORIDE 2 MILLIGRAM(S): 2 INJECTION INTRAMUSCULAR; INTRAVENOUS; SUBCUTANEOUS at 07:06

## 2017-03-04 RX ADMIN — CELECOXIB 200 MILLIGRAM(S): 200 CAPSULE ORAL at 17:52

## 2017-03-04 RX ADMIN — HYDROMORPHONE HYDROCHLORIDE 4 MILLIGRAM(S): 2 INJECTION INTRAMUSCULAR; INTRAVENOUS; SUBCUTANEOUS at 19:30

## 2017-03-04 RX ADMIN — HYDROMORPHONE HYDROCHLORIDE 4 MILLIGRAM(S): 2 INJECTION INTRAMUSCULAR; INTRAVENOUS; SUBCUTANEOUS at 18:44

## 2017-03-04 RX ADMIN — CELECOXIB 200 MILLIGRAM(S): 200 CAPSULE ORAL at 05:52

## 2017-03-04 RX ADMIN — HYDROMORPHONE HYDROCHLORIDE 2 MILLIGRAM(S): 2 INJECTION INTRAMUSCULAR; INTRAVENOUS; SUBCUTANEOUS at 00:00

## 2017-03-04 RX ADMIN — HYDROMORPHONE HYDROCHLORIDE 4 MILLIGRAM(S): 2 INJECTION INTRAMUSCULAR; INTRAVENOUS; SUBCUTANEOUS at 15:30

## 2017-03-04 RX ADMIN — HYDROMORPHONE HYDROCHLORIDE 4 MILLIGRAM(S): 2 INJECTION INTRAMUSCULAR; INTRAVENOUS; SUBCUTANEOUS at 23:37

## 2017-03-04 RX ADMIN — Medication 10 MILLIGRAM(S): at 01:20

## 2017-03-04 RX ADMIN — HYDROMORPHONE HYDROCHLORIDE 4 MILLIGRAM(S): 2 INJECTION INTRAMUSCULAR; INTRAVENOUS; SUBCUTANEOUS at 11:30

## 2017-03-04 RX ADMIN — Medication 100 MILLIGRAM(S): at 23:07

## 2017-03-04 RX ADMIN — HYDROMORPHONE HYDROCHLORIDE 2 MILLIGRAM(S): 2 INJECTION INTRAMUSCULAR; INTRAVENOUS; SUBCUTANEOUS at 04:10

## 2017-03-04 RX ADMIN — Medication 650 MILLIGRAM(S): at 19:06

## 2017-03-04 NOTE — PROGRESS NOTE ADULT - SUBJECTIVE AND OBJECTIVE BOX
Interval Events:  Patient seen and examined at bedside.    Patient is a 61y old  Male who presents with a chief complaint of right knee pain (01 Mar 2017 14:22)  The patient is doing well but his concern about his blood pressure. The pain is not bad is controlled. Is not sole to the food    PAST MEDICAL & SURGICAL HISTORY:  Involuntary movements: tremors of hands.  Acid reflux disease  Hypertension  Polymyalgia rheumatica syndrome  History of tonsillectomy  Wrist impingement syndrome: right vein released  Knee injuries: right knee microfracture  H/O arthroscopy of right knee  Shoulder instability: belateral shoulder stabalization      MEDICATIONS:  Pulmonary:    Antimicrobials:    Anticoagulants:  aspirin enteric coated 325milliGRAM(s) Oral two times a day    Cardiac:  nebivolol 5milliGRAM(s) Oral daily      Allergies    penicillin (Hives (Unknown))    Intolerances        Vital Signs Last 24 Hrs  T(C): 37.4, Max: 37.4 (03-04 @ 20:07)  T(F): 99.3, Max: 99.3 (03-04 @ 20:07)  HR: 76 (70 - 78)  BP: 168/82 (133/89 - 171/93)  BP(mean): --  RR: 15 (14 - 16)  SpO2: 95% (95% - 98%)  I & Os for 24h ending 03-04 @ 07:00  =============================================  IN: 460 ml / OUT: 3950 ml / NET: -3490 ml    I & Os for current day (as of 03-04 @ 22:36)  =============================================  IN: 720 ml / OUT: 1000 ml / NET: -280 ml        LABS:      CBC Full  -  ( 03 Mar 2017 16:44 )  WBC Count : 11.5 K/uL  Hemoglobin : 13.3 g/dL  Hematocrit : 38.6 %  Platelet Count - Automated : 165 K/uL  Mean Cell Volume : 90.0 fL  Mean Cell Hemoglobin : 31.0 pg  Mean Cell Hemoglobin Concentration : 34.5 g/dL  Auto Neutrophil # : x  Auto Lymphocyte # : x  Auto Monocyte # : x  Auto Eosinophil # : x  Auto Basophil # : x  Auto Neutrophil % : x  Auto Lymphocyte % : x  Auto Monocyte % : x  Auto Eosinophil % : x  Auto Basophil % : x                              RADIOLOGY & ADDITIONAL STUDIES (The following images were personally reviewed):  Schwarz:                             No  Urine output:               Yes          DVT prophylaxis:         Yes          Flattus:                          Yes          Bowel movement:       Yes

## 2017-03-04 NOTE — PROGRESS NOTE ADULT - PROBLEM SELECTOR PLAN 1
I patient the blood pressure on the patient and reviewed the blood pressure reading the patient and the family. Patient will pressures is elevated I discussed the case lost night with the surgical resident and the patient was given IV hydralazine to control the blood pressure which is  .Not in  distress but is complaining of headache that could be related to the blood pressure. I will get the extra dose of bisystolic today talk to the pharmacist to clear the order and tomorrow increased of the started bystolic 5 mg extraand I'll call his cardiologist tomorrow

## 2017-03-05 LAB
ANION GAP SERPL CALC-SCNC: 7 MMOL/L — LOW (ref 9–16)
BUN SERPL-MCNC: 16 MG/DL — SIGNIFICANT CHANGE UP (ref 7–23)
CALCIUM SERPL-MCNC: 8.5 MG/DL — SIGNIFICANT CHANGE UP (ref 8.5–10.5)
CHLORIDE SERPL-SCNC: 104 MMOL/L — SIGNIFICANT CHANGE UP (ref 96–108)
CO2 SERPL-SCNC: 28 MMOL/L — SIGNIFICANT CHANGE UP (ref 22–31)
CREAT SERPL-MCNC: 0.96 MG/DL — SIGNIFICANT CHANGE UP (ref 0.5–1.3)
GLUCOSE SERPL-MCNC: 103 MG/DL — HIGH (ref 70–99)
HCT VFR BLD CALC: 39.3 % — SIGNIFICANT CHANGE UP (ref 39–50)
HGB BLD-MCNC: 13.7 G/DL — SIGNIFICANT CHANGE UP (ref 13–17)
MCHC RBC-ENTMCNC: 31.4 PG — SIGNIFICANT CHANGE UP (ref 27–34)
MCHC RBC-ENTMCNC: 34.9 G/DL — SIGNIFICANT CHANGE UP (ref 32–36)
MCV RBC AUTO: 90.1 FL — SIGNIFICANT CHANGE UP (ref 80–100)
PLATELET # BLD AUTO: 169 K/UL — SIGNIFICANT CHANGE UP (ref 150–400)
POTASSIUM SERPL-MCNC: 3.8 MMOL/L — SIGNIFICANT CHANGE UP (ref 3.5–5.3)
POTASSIUM SERPL-SCNC: 3.8 MMOL/L — SIGNIFICANT CHANGE UP (ref 3.5–5.3)
RBC # BLD: 4.36 M/UL — SIGNIFICANT CHANGE UP (ref 4.2–5.8)
RBC # FLD: 12.4 % — SIGNIFICANT CHANGE UP (ref 10.3–16.9)
SODIUM SERPL-SCNC: 139 MMOL/L — SIGNIFICANT CHANGE UP (ref 135–145)
WBC # BLD: 10.5 K/UL — SIGNIFICANT CHANGE UP (ref 3.8–10.5)
WBC # FLD AUTO: 10.5 K/UL — SIGNIFICANT CHANGE UP (ref 3.8–10.5)

## 2017-03-05 PROCEDURE — 99232 SBSQ HOSP IP/OBS MODERATE 35: CPT | Mod: GC

## 2017-03-05 RX ORDER — CLONAZEPAM 1 MG
0.5 TABLET ORAL ONCE
Qty: 0 | Refills: 0 | Status: DISCONTINUED | OUTPATIENT
Start: 2017-03-05 | End: 2017-03-05

## 2017-03-05 RX ORDER — NEBIVOLOL HYDROCHLORIDE 5 MG/1
5 TABLET ORAL
Qty: 0 | Refills: 0 | Status: DISCONTINUED | OUTPATIENT
Start: 2017-03-05 | End: 2017-03-07

## 2017-03-05 RX ADMIN — HYDROMORPHONE HYDROCHLORIDE 4 MILLIGRAM(S): 2 INJECTION INTRAMUSCULAR; INTRAVENOUS; SUBCUTANEOUS at 10:03

## 2017-03-05 RX ADMIN — HYDROMORPHONE HYDROCHLORIDE 4 MILLIGRAM(S): 2 INJECTION INTRAMUSCULAR; INTRAVENOUS; SUBCUTANEOUS at 23:27

## 2017-03-05 RX ADMIN — HYDROMORPHONE HYDROCHLORIDE 4 MILLIGRAM(S): 2 INJECTION INTRAMUSCULAR; INTRAVENOUS; SUBCUTANEOUS at 15:28

## 2017-03-05 RX ADMIN — NEBIVOLOL HYDROCHLORIDE 5 MILLIGRAM(S): 5 TABLET ORAL at 21:02

## 2017-03-05 RX ADMIN — Medication 100 MILLIGRAM(S): at 13:15

## 2017-03-05 RX ADMIN — HYDROMORPHONE HYDROCHLORIDE 4 MILLIGRAM(S): 2 INJECTION INTRAMUSCULAR; INTRAVENOUS; SUBCUTANEOUS at 18:24

## 2017-03-05 RX ADMIN — HYDROMORPHONE HYDROCHLORIDE 4 MILLIGRAM(S): 2 INJECTION INTRAMUSCULAR; INTRAVENOUS; SUBCUTANEOUS at 19:24

## 2017-03-05 RX ADMIN — Medication 650 MILLIGRAM(S): at 21:02

## 2017-03-05 RX ADMIN — Medication 325 MILLIGRAM(S): at 05:04

## 2017-03-05 RX ADMIN — Medication 100 MILLIGRAM(S): at 05:04

## 2017-03-05 RX ADMIN — HYDROMORPHONE HYDROCHLORIDE 4 MILLIGRAM(S): 2 INJECTION INTRAMUSCULAR; INTRAVENOUS; SUBCUTANEOUS at 11:03

## 2017-03-05 RX ADMIN — Medication 325 MILLIGRAM(S): at 18:24

## 2017-03-05 RX ADMIN — LACTULOSE 10 GRAM(S): 10 SOLUTION ORAL at 09:10

## 2017-03-05 RX ADMIN — Medication 30 MILLILITER(S): at 22:27

## 2017-03-05 RX ADMIN — SCOPALAMINE 1.5 MILLIGRAM(S): 1 PATCH, EXTENDED RELEASE TRANSDERMAL at 07:46

## 2017-03-05 RX ADMIN — HYDROMORPHONE HYDROCHLORIDE 4 MILLIGRAM(S): 2 INJECTION INTRAMUSCULAR; INTRAVENOUS; SUBCUTANEOUS at 05:40

## 2017-03-05 RX ADMIN — HYDROMORPHONE HYDROCHLORIDE 4 MILLIGRAM(S): 2 INJECTION INTRAMUSCULAR; INTRAVENOUS; SUBCUTANEOUS at 22:27

## 2017-03-05 RX ADMIN — HYDROMORPHONE HYDROCHLORIDE 4 MILLIGRAM(S): 2 INJECTION INTRAMUSCULAR; INTRAVENOUS; SUBCUTANEOUS at 05:04

## 2017-03-05 RX ADMIN — NEBIVOLOL HYDROCHLORIDE 5 MILLIGRAM(S): 5 TABLET ORAL at 15:01

## 2017-03-05 RX ADMIN — Medication 0.5 MILLIGRAM(S): at 23:10

## 2017-03-05 RX ADMIN — HYDROMORPHONE HYDROCHLORIDE 4 MILLIGRAM(S): 2 INJECTION INTRAMUSCULAR; INTRAVENOUS; SUBCUTANEOUS at 14:28

## 2017-03-05 NOTE — PROGRESS NOTE ADULT - PROBLEM SELECTOR PLAN 1
her blood pressure better controlled. I increased the bystolic to 5 mg twice a day. I discussed the case in details with the patient reporting that he and will need to evaluate the blood pressure as outpatient

## 2017-03-05 NOTE — PROGRESS NOTE ADULT - SUBJECTIVE AND OBJECTIVE BOX
Interval Events: reviewed  Patient seen and examined at bedside.    Patient is a 61y old  Male who presents with a chief complaint of right knee pain (01 Mar 2017 14:22)  The patient is doing well but his concern about his blood pressure. but he is feeling better today.    PAST MEDICAL & SURGICAL HISTORY:  Involuntary movements: tremors of hands.  Acid reflux disease  Hypertension  Polymyalgia rheumatica syndrome  History of tonsillectomy  Wrist impingement syndrome: right vein released  Knee injuries: right knee microfracture  H/O arthroscopy of right knee  Shoulder instability: belateral shoulder stabalization      MEDICATIONS:  Pulmonary:    Antimicrobials:    Anticoagulants:  aspirin enteric coated 325milliGRAM(s) Oral two times a day    Cardiac:  nebivolol 5milliGRAM(s) Oral daily      Allergies    penicillin (Hives (Unknown))    Intolerances        Vital Signs Last 24 Hrs  T(C): 37.4, Max: 37.4 (03-04 @ 20:07)  T(F): 99.3, Max: 99.3 (03-04 @ 20:07)  HR: 76 (70 - 78)  BP: 168/82 (133/89 - 171/93)  BP(mean): --  RR: 15 (14 - 16)  SpO2: 95% (95% - 98%)  I & Os for 24h ending 03-04 @ 07:00  =============================================  IN: 460 ml / OUT: 3950 ml / NET: -3490 ml    I & Os for current day (as of 03-04 @ 22:36)  =============================================  IN: 720 ml / OUT: 1000 ml / NET: -280 ml        LABS:      CBC Full  -  ( 03 Mar 2017 16:44 )  WBC Count : 11.5 K/uL  Hemoglobin : 13.3 g/dL  Hematocrit : 38.6 %  Platelet Count - Automated : 165 K/uL  Mean Cell Volume : 90.0 fL  Mean Cell Hemoglobin : 31.0 pg  Mean Cell Hemoglobin Concentration : 34.5 g/dL  Auto Neutrophil # : x  Auto Lymphocyte # : x  Auto Monocyte # : x  Auto Eosinophil # : x  Auto Basophil # : x  Auto Neutrophil % : x  Auto Lymphocyte % : x  Auto Monocyte % : x  Auto Eosinophil % : x  Auto Basophil % : x                              RADIOLOGY & ADDITIONAL STUDIES (The following images were personally reviewed):  Schwarz:                             No  Urine output:               Yes          DVT prophylaxis:         Yes          Flattus:                          Yes          Bowel movement:       Yes

## 2017-03-06 PROCEDURE — 99222 1ST HOSP IP/OBS MODERATE 55: CPT

## 2017-03-06 PROCEDURE — 99232 SBSQ HOSP IP/OBS MODERATE 35: CPT | Mod: GC

## 2017-03-06 RX ORDER — ONDANSETRON 8 MG/1
4 TABLET, FILM COATED ORAL EVERY 4 HOURS
Qty: 0 | Refills: 0 | Status: DISCONTINUED | OUTPATIENT
Start: 2017-03-06 | End: 2017-03-07

## 2017-03-06 RX ORDER — PANTOPRAZOLE SODIUM 20 MG/1
40 TABLET, DELAYED RELEASE ORAL
Qty: 0 | Refills: 0 | Status: DISCONTINUED | OUTPATIENT
Start: 2017-03-06 | End: 2017-03-07

## 2017-03-06 RX ORDER — SCOPALAMINE 1 MG/3D
1.5 PATCH, EXTENDED RELEASE TRANSDERMAL ONCE
Qty: 0 | Refills: 0 | Status: COMPLETED | OUTPATIENT
Start: 2017-03-06 | End: 2017-03-06

## 2017-03-06 RX ORDER — ONDANSETRON 8 MG/1
4 TABLET, FILM COATED ORAL EVERY 4 HOURS
Qty: 0 | Refills: 0 | Status: DISCONTINUED | OUTPATIENT
Start: 2017-03-06 | End: 2017-03-06

## 2017-03-06 RX ORDER — METOCLOPRAMIDE HCL 10 MG
10 TABLET ORAL EVERY 6 HOURS
Qty: 0 | Refills: 0 | Status: COMPLETED | OUTPATIENT
Start: 2017-03-06 | End: 2017-03-06

## 2017-03-06 RX ORDER — ACETAMINOPHEN 500 MG
975 TABLET ORAL EVERY 8 HOURS
Qty: 0 | Refills: 0 | Status: DISCONTINUED | OUTPATIENT
Start: 2017-03-06 | End: 2017-03-07

## 2017-03-06 RX ORDER — IBUPROFEN 200 MG
600 TABLET ORAL
Qty: 0 | Refills: 0 | Status: DISCONTINUED | OUTPATIENT
Start: 2017-03-06 | End: 2017-03-07

## 2017-03-06 RX ADMIN — NEBIVOLOL HYDROCHLORIDE 5 MILLIGRAM(S): 5 TABLET ORAL at 05:39

## 2017-03-06 RX ADMIN — SCOPALAMINE 1.5 MILLIGRAM(S): 1 PATCH, EXTENDED RELEASE TRANSDERMAL at 14:34

## 2017-03-06 RX ADMIN — HYDROMORPHONE HYDROCHLORIDE 4 MILLIGRAM(S): 2 INJECTION INTRAMUSCULAR; INTRAVENOUS; SUBCUTANEOUS at 02:26

## 2017-03-06 RX ADMIN — Medication 975 MILLIGRAM(S): at 22:30

## 2017-03-06 RX ADMIN — Medication 600 MILLIGRAM(S): at 18:32

## 2017-03-06 RX ADMIN — HYDROMORPHONE HYDROCHLORIDE 4 MILLIGRAM(S): 2 INJECTION INTRAMUSCULAR; INTRAVENOUS; SUBCUTANEOUS at 11:31

## 2017-03-06 RX ADMIN — NEBIVOLOL HYDROCHLORIDE 5 MILLIGRAM(S): 5 TABLET ORAL at 18:32

## 2017-03-06 RX ADMIN — ONDANSETRON 4 MILLIGRAM(S): 8 TABLET, FILM COATED ORAL at 11:22

## 2017-03-06 RX ADMIN — Medication 100 MILLIGRAM(S): at 21:35

## 2017-03-06 RX ADMIN — Medication 600 MILLIGRAM(S): at 19:32

## 2017-03-06 RX ADMIN — Medication 325 MILLIGRAM(S): at 18:32

## 2017-03-06 RX ADMIN — ONDANSETRON 4 MILLIGRAM(S): 8 TABLET, FILM COATED ORAL at 15:23

## 2017-03-06 RX ADMIN — Medication 10 MILLIGRAM(S): at 16:28

## 2017-03-06 RX ADMIN — Medication 975 MILLIGRAM(S): at 21:35

## 2017-03-06 RX ADMIN — Medication 100 MILLIGRAM(S): at 05:39

## 2017-03-06 RX ADMIN — Medication 650 MILLIGRAM(S): at 11:22

## 2017-03-06 RX ADMIN — HYDROMORPHONE HYDROCHLORIDE 4 MILLIGRAM(S): 2 INJECTION INTRAMUSCULAR; INTRAVENOUS; SUBCUTANEOUS at 03:26

## 2017-03-06 RX ADMIN — Medication 100 MILLIGRAM(S): at 13:52

## 2017-03-06 RX ADMIN — HYDROMORPHONE HYDROCHLORIDE 4 MILLIGRAM(S): 2 INJECTION INTRAMUSCULAR; INTRAVENOUS; SUBCUTANEOUS at 10:31

## 2017-03-06 RX ADMIN — Medication 325 MILLIGRAM(S): at 05:39
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
## 2017-03-06 NOTE — DIETITIAN INITIAL EVALUATION ADULT. - OTHER INFO
60y/o M s/p R TKA. Pt out of room at time of assessment x2. Per RN pt with good meal intake and tolerance. 75% breakfast consume at bedside. No apparent GI distress and mechanical issues noted. Will follow and provide education as appropriate.
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
## 2017-03-06 NOTE — DIETITIAN INITIAL EVALUATION ADULT. - NS AS NUTRI INTERV ED CONTENT
Priority modifications/Nutrition relationship to health/disease/Recommended modifications/Will provide wt management/Dash/TLC diet edu upo f/u/Purpose of the nutrition education/Survival information

## 2017-03-06 NOTE — PROGRESS NOTE ADULT - PROBLEM SELECTOR PLAN 1
I have a long discussion with the patient and the family. I also called the patients cardiologist and discussed the case with more details. It seems that the blood pressure better control on the current regimen. Patient has reasonable blood pressure which seems to be related to reaction to dilaudid.  Patient requested cardiology and the patient is no is going to be seen by cardiologist

## 2017-03-07 VITALS — SYSTOLIC BLOOD PRESSURE: 145 MMHG | DIASTOLIC BLOOD PRESSURE: 79 MMHG

## 2017-03-07 PROCEDURE — 88300 SURGICAL PATH GROSS: CPT

## 2017-03-07 PROCEDURE — C1713: CPT

## 2017-03-07 PROCEDURE — 86850 RBC ANTIBODY SCREEN: CPT

## 2017-03-07 PROCEDURE — 99232 SBSQ HOSP IP/OBS MODERATE 35: CPT

## 2017-03-07 PROCEDURE — 80048 BASIC METABOLIC PNL TOTAL CA: CPT

## 2017-03-07 PROCEDURE — 73560 X-RAY EXAM OF KNEE 1 OR 2: CPT

## 2017-03-07 PROCEDURE — 36415 COLL VENOUS BLD VENIPUNCTURE: CPT

## 2017-03-07 PROCEDURE — 87641 MR-STAPH DNA AMP PROBE: CPT

## 2017-03-07 PROCEDURE — 85027 COMPLETE CBC AUTOMATED: CPT

## 2017-03-07 PROCEDURE — C1776: CPT

## 2017-03-07 PROCEDURE — 97116 GAIT TRAINING THERAPY: CPT

## 2017-03-07 PROCEDURE — 86900 BLOOD TYPING SEROLOGIC ABO: CPT

## 2017-03-07 PROCEDURE — 86901 BLOOD TYPING SEROLOGIC RH(D): CPT

## 2017-03-07 PROCEDURE — 97161 PT EVAL LOW COMPLEX 20 MIN: CPT

## 2017-03-07 RX ORDER — POLYETHYLENE GLYCOL 3350 17 G/17G
17 POWDER, FOR SOLUTION ORAL
Qty: 0 | Refills: 0 | COMMUNITY
Start: 2017-03-07

## 2017-03-07 RX ORDER — SENNA PLUS 8.6 MG/1
2 TABLET ORAL
Qty: 0 | Refills: 0 | COMMUNITY
Start: 2017-03-07

## 2017-03-07 RX ORDER — ACETAMINOPHEN 500 MG
3 TABLET ORAL
Qty: 0 | Refills: 0 | COMMUNITY
Start: 2017-03-07

## 2017-03-07 RX ORDER — DOCUSATE SODIUM 100 MG
1 CAPSULE ORAL
Qty: 0 | Refills: 0 | COMMUNITY
Start: 2017-03-07

## 2017-03-07 RX ORDER — SCOPALAMINE 1 MG/3D
1 PATCH, EXTENDED RELEASE TRANSDERMAL
Qty: 1 | Refills: 0 | OUTPATIENT
Start: 2017-03-07

## 2017-03-07 RX ORDER — ONDANSETRON 8 MG/1
1 TABLET, FILM COATED ORAL
Qty: 21 | Refills: 0 | OUTPATIENT
Start: 2017-03-07 | End: 2017-03-14

## 2017-03-07 RX ADMIN — Medication 100 MILLIGRAM(S): at 06:11

## 2017-03-07 RX ADMIN — Medication 600 MILLIGRAM(S): at 07:00

## 2017-03-07 RX ADMIN — Medication 325 MILLIGRAM(S): at 06:11

## 2017-03-07 RX ADMIN — Medication 975 MILLIGRAM(S): at 08:46

## 2017-03-07 RX ADMIN — ONDANSETRON 4 MILLIGRAM(S): 8 TABLET, FILM COATED ORAL at 13:09

## 2017-03-07 RX ADMIN — NEBIVOLOL HYDROCHLORIDE 5 MILLIGRAM(S): 5 TABLET ORAL at 06:11

## 2017-03-07 RX ADMIN — PANTOPRAZOLE SODIUM 40 MILLIGRAM(S): 20 TABLET, DELAYED RELEASE ORAL at 06:11

## 2017-03-07 RX ADMIN — Medication 600 MILLIGRAM(S): at 06:11

## 2017-03-07 RX ADMIN — Medication 975 MILLIGRAM(S): at 09:46

## 2017-03-07 RX ADMIN — Medication 2 MILLIGRAM(S): at 11:44

## 2017-03-07 NOTE — PROGRESS NOTE ADULT - NS NEC GEN PE MLT EXAM PC
No bruits; no thyromegaly or nodules

## 2017-03-07 NOTE — PROGRESS NOTE ADULT - ATTENDING COMMENTS
I discussed the case in details with the wife the patient. The patient is clinically stable on the current antihypertensive regimen. I informed the patient that he my need to decreased the dose of the current antihypertensive. Patient was follow up with his own cardiologist local
I discussed the case in details with the patient and family. Answered all the question. Patient is clinically stable in the blood pressure is very likely related to pain management. I informed them that he has to readdress current regimen of antihypertensive once she was to show all with his cardiologist

## 2017-03-07 NOTE — PROGRESS NOTE ADULT - SUBJECTIVE AND OBJECTIVE BOX
Pain Management Progress Note - [facility] [phone]    HPI:  Pt. states pain in the right knee is tolerable for the most part.  Pt. states he was able to walk with PT today and do stairs.  States certain movements cause an increase in pain.  Pt. states he has not taken norco as of yet.  Denies N/V today.  Pt. thinks he has taken norco in the past and tolerated it.            Pertinent PMH: Pain at: ___Back ___Neck_x__Knee ___Hip ___Shoulder ___ Opioid tolerance    Pain is ___ sharp ____dull ___burning _x__achy ___ Intensity: ____ mild ____mod ____severe     Location __x___surgical site _____cervical _____lumbar ____abd _____upper ext____lower ext    Worse with __x__activity _x___movement _____physical therapy___ Rest    Improved with __x__medication _x___rest ____physical therapy      aspirin enteric coated  aluminum hydroxide/magnesium hydroxide/simethicone Suspension  magnesium hydroxide Suspension  polyethylene glycol 3350  bisacodyl Suppository  senna  docusate sodium  nebivolol  pantoprazole    Tablet  acetaminophen   Tablet.  HYDROcodone  5 mG/acetaminophen 325 mG  ondansetron    Tablet  predniSONE   Tablet      ROS: Const:  ___febrile   Eyes:___ENT:___CV: ___chest pain  Resp: ____sob  GI:_-__nausea _-__vomiting _-___abd pain ___npo ___clears _+__full diet __bm  :___ Musk: _+__pain ___spasm  Skin:___ Neuro:  _-__vkhcxtpx__-_ufthigwwv__-__ numbness _-__weakness ___paresthesia  Psych:___anxiety  Endo:___ Heme:___Allergy:___                T(C): 36.7, Max: 37.6 (03-06 @ 15:58)  HR: 72 (67 - 72)  BP: 145/79 (128/78 - 163/93)  RR: 15 (15 - 16)  SpO2: 98% (97% - 98%)  Wt(kg): --     PHYSICAL EXAM:  Gen Appearance: _x__no acute distress _x__appropriate         Neuro: _x__SILT feet__x__ EOM Intact Psych: AAOX_3_, _x__mood/affect appropriate        Eyes: _x__conjunctiva WNL  __x___ Pupils equal and round        ENT: __x_ears and nose atraumatic_x__ Hearing grossly intact        Neck: ___trachea midline, no visible masses ___thyroid without palpable mass    Resp: _x__Nml WOB____No tactile fremitus ___clear to auscultation    Cardio: ___extremities free from edema ____pedal pulses palpable    GI/Abdomen: _x__soft ___x__ Nontender______Nondistended_____HSM    Lymphatic: ___no palpable nodes in neck  ___no palpable nodes calves and feet    Skin/Wound: ___Incision, ___Dressing c/d/i,   ____surrounding tissues soft,  ___drain/chest tube present____    Muscular: EHL 5___/5  Gastrocnemius___/5    _x__absent clubbing/cyanosis         ASSESSMENT:  This is a 61y old Male with a history of:  UNILATERAL PRIMARYOSTEOARTHRITIS  Involuntary movements  Acid reflux disease  Hypertension  Polymyalgia rheumatica syndrome  Arthritis  Polymyalgia rheumatica syndrome  Hypertension  Arthritis  History of tonsillectomy  Wrist impingement syndrome  Knee injuries  H/O arthroscopy of right knee  Shoulder instability  and right knee pain S/P R TKR and is doing better.      Recommended Treatment PLAN:  1.  Tylenol 975 mg po q8h  2.  Norco 5/325 1 tab po q6h prn

## 2017-03-07 NOTE — PROGRESS NOTE ADULT - BACK
No deformity or limitation of movement

## 2017-03-07 NOTE — PROGRESS NOTE ADULT - PROBLEM SELECTOR PLAN 2
Continue current regimen.

## 2017-03-07 NOTE — PROGRESS NOTE ADULT - SUBJECTIVE AND OBJECTIVE BOX
Interval Events:  Patient seen and examined at bedside.    Patient is a 61y old  Male who presents with a chief complaint of right knee pain/ right total knee replacement 3/1/17 (01 Mar 2017 14:22)    He is feeling better today and the pain is controlled. His walking more  PAST MEDICAL & SURGICAL HISTORY:  Involuntary movements: tremors of hands.  Acid reflux disease  Hypertension  Polymyalgia rheumatica syndrome  History of tonsillectomy  Wrist impingement syndrome: right vein released  Knee injuries: right knee microfracture  H/O arthroscopy of right knee  Shoulder instability: belateral shoulder stabalization      MEDICATIONS:  Pulmonary:    Antimicrobials:    Anticoagulants:  aspirin enteric coated 325milliGRAM(s) Oral two times a day    Cardiac:  nebivolol 5milliGRAM(s) Oral two times a day      Allergies    penicillin (Hives (Unknown))    Intolerances        Vital Signs Last 24 Hrs  T(C): 36.7, Max: 36.7 (03-07 @ 08:52)  T(F): 98, Max: 98 (03-07 @ 08:52)  HR: 72 (67 - 72)  BP: 145/79 (144/84 - 145/79)  BP(mean): --  RR: 15 (15 - 16)  SpO2: 98% (98% - 98%)  I & Os for 24h ending 03-07 @ 07:00  =============================================  IN: 720 ml / OUT: 1000 ml / NET: -280 ml    I & Os for current day (as of 03-07 @ 23:07)  =============================================  IN: 320 ml / OUT: 400 ml / NET: -80 ml        LABS:                                  RADIOLOGY & ADDITIONAL STUDIES (The following images were personally reviewed):  Schwarz:                                No  Urine output:               Yes          DVT prophylaxis:         Yes          Flattus:                          Yes          Bowel movement:       Yes

## 2017-03-07 NOTE — PROGRESS NOTE ADULT - PROBLEM SELECTOR PROBLEM 2
Polymyalgia rheumatica syndrome

## 2017-03-07 NOTE — PROGRESS NOTE ADULT - EYES
EOMI; PERRL; no drainage or redness

## 2017-03-07 NOTE — PROGRESS NOTE ADULT - PROBLEM SELECTOR PLAN 1
IThe patient was seen by cardiology and the consult was reviewed. The blood pressure is better control today and that could be due to related decreased the pain an increase in the comfort with increase in the size off the beta blockers.

## 2017-03-07 NOTE — PROGRESS NOTE ADULT - SUBJECTIVE AND OBJECTIVE BOX
Chief Complaint/Reason for Consult: HTN  INTERVAL HPI: feels better no h/a no n/v no chest pain sob palp  	  MEDICATIONS:  nebivolol 5milliGRAM(s) Oral two times a day        HYDROcodone  5 mG/acetaminophen 325 mG 1Tablet(s) Oral every 6 hours PRN  acetaminophen   Tablet. 975milliGRAM(s) Oral every 8 hours PRN  ondansetron    Tablet 4milliGRAM(s) Oral every 4 hours PRN    aluminum hydroxide/magnesium hydroxide/simethicone Suspension 30milliLiter(s) Oral four times a day PRN  magnesium hydroxide Suspension 30milliLiter(s) Oral daily PRN  polyethylene glycol 3350 17Gram(s) Oral daily  bisacodyl Suppository 10milliGRAM(s) Rectal daily PRN  senna 2Tablet(s) Oral at bedtime PRN  docusate sodium 100milliGRAM(s) Oral three times a day  pantoprazole    Tablet 40milliGRAM(s) Oral before breakfast    predniSONE   Tablet 2milliGRAM(s) Oral daily    aspirin enteric coated 325milliGRAM(s) Oral two times a day      REVIEW OF SYSTEMS:  [x] As per HPI  CONSTITUTIONAL: No fever, weight loss, or fatigue  RESPIRATORY: No cough, wheezing, chills or hemoptysis; No Shortness of Breath  CARDIOVASCULAR: No chest pain, palpitations, dizziness, or leg swelling  GASTROINTESTINAL: No abdominal or epigastric pain. No nausea, vomiting, or hematemesis; No diarrhea or constipation. No melena or hematochezia.  MUSCULOSKELETAL: No joint pain or swelling; No muscle, back, or extremity pain  [x] All others negative	  [ ] Unable to obtain    PHYSICAL EXAM:  T(C): 36.7, Max: 37.6 (03-06 @ 15:58)  HR: 72 (67 - 72)  BP: 145/79 (128/78 - 163/93)  RR: 15 (15 - 16)  SpO2: 98% (97% - 98%)  Wt(kg): --  I&O's Summary  I & Os for 24h ending 07 Mar 2017 07:00  =============================================  IN: 720 ml / OUT: 1000 ml / NET: -280 ml    I & Os for current day (as of 07 Mar 2017 12:29)  =============================================  IN: 320 ml / OUT: 400 ml / NET: -80 ml        Appearance: Normal	  HEENT:   Normal oral mucosa  Cardiovascular: Normal S1 S2, No JVD, No murmurs, No edema  Respiratory: Lungs clear to auscultation	  Gastrointestinal:  Soft, Non-tender, + BS	  Extremities: Normal range of motion, No clubbing, cyanosis or edema  Vascular: Peripheral pulses palpable 2+ bilaterally    TELEMETRY: 	    ECG:    	  RADIOLOGY:   CXR:  CT:  US:    CARDIAC TESTING:  Echocardiogram:  Catheterization:  Stress Test:      LABS:	 	    CARDIAC MARKERS:                    proBNP:   Lipid Profile:   HgA1c:   TSH:     ASSESSMENT/PLAN: 	  1. BP better controlled with current bystolic regimen.  2. pain mgmt recs appreciated.  3. replete electrolytes

## 2017-03-07 NOTE — PROGRESS NOTE ADULT - CONSTITUTIONAL
Well-developed, well nourished
detailed exam

## 2017-03-07 NOTE — PROGRESS NOTE ADULT - VASCULAR
Equal and normal pulses (carotid, femoral, dorsalis pedis)

## 2017-03-07 NOTE — PROGRESS NOTE ADULT - PROVIDER SPECIALTY LIST ADULT
Cardiology
Internal Medicine
Orthopedics
Pain Medicine
Orthopedics
Internal Medicine

## 2017-03-07 NOTE — PROGRESS NOTE ADULT - NEUROLOGICAL COMMENTS
Patient is complaining of haeadache

## 2017-03-07 NOTE — PROGRESS NOTE ADULT - EXTREMITIES
No cyanosis, clubbing or edema

## 2017-03-09 DIAGNOSIS — M17.11 UNILATERAL PRIMARY OSTEOARTHRITIS, RIGHT KNEE: ICD-10-CM

## 2017-03-09 DIAGNOSIS — M35.3 POLYMYALGIA RHEUMATICA: ICD-10-CM

## 2017-03-09 DIAGNOSIS — I10 ESSENTIAL (PRIMARY) HYPERTENSION: ICD-10-CM

## 2017-03-09 DIAGNOSIS — M25.761 OSTEOPHYTE, RIGHT KNEE: ICD-10-CM

## 2017-03-09 DIAGNOSIS — Z28.21 IMMUNIZATION NOT CARRIED OUT BECAUSE OF PATIENT REFUSAL: ICD-10-CM

## 2017-03-09 DIAGNOSIS — K21.9 GASTRO-ESOPHAGEAL REFLUX DISEASE WITHOUT ESOPHAGITIS: ICD-10-CM

## 2017-03-09 DIAGNOSIS — Z79.82 LONG TERM (CURRENT) USE OF ASPIRIN: ICD-10-CM

## 2017-03-09 DIAGNOSIS — Z88.0 ALLERGY STATUS TO PENICILLIN: ICD-10-CM

## 2018-03-09 NOTE — DIETITIAN INITIAL EVALUATION ADULT. - ETIOLOGY
RT suspected excessive calorie intake and sedentary lifestyle
Alert and oriented to person, place, time/situation. normal mood and affect. no apparent risk to self or others.

## 2021-01-21 NOTE — PATIENT PROFILE ADULT. - PAIN SCALE PREFERRED, PROFILE
1/21/2021:  MCM to determine final goal for OD based on outcome of OS after the weekend of healing (today K edema is limiting result and will not allow for assessment of pt satisfaction with outcome to allow goal OD TBD).   pt very disagreeable today and. numerical 0-10

## 2023-10-19 NOTE — PROGRESS NOTE ADULT - SUBJECTIVE AND OBJECTIVE BOX
ORTHO NOTE    [ x] Pt seen/examined.  [ ] Pt without any complaints/in NAD.    [x ] Pt complains of: fluctuations in blood pressure and headache      ROS: [ ] Fever  [ ] Chills  [ ] CP [ ] SOB [ ] Dysnea  [ ] Palpitations [ ] Cough [ ] N/V/C/D [ ] Paresthia [ ] Other     [x ] ROS  otherwise negative    .    PHYSICAL EXAM:    Vital Signs Last 24 Hrs  T(C): 36.3, Max: 38.1 (03-05 @ 22:25)  T(F): 97.3, Max: 100.5 (03-05 @ 22:25)  HR: 80 (77 - 88)  BP: 118/77 (118/77 - 169/85)  BP(mean): --  RR: 17 (15 - 17)  SpO2: 96% (94% - 96%)    I&O's Detail  I & Os for 24h ending 06 Mar 2017 07:00  =============================================  IN:    Oral Fluid: 1200 ml    Total IN: 1200 ml  ---------------------------------------------  OUT:    Voided: 2150 ml    Total OUT: 2150 ml  ---------------------------------------------  Total NET: -950 ml    I & Os for current day (as of 06 Mar 2017 11:15)  =============================================  IN:    Oral Fluid: 360 ml    Total IN: 360 ml  ---------------------------------------------  OUT:    Voided: 400 ml    Total OUT: 400 ml  ---------------------------------------------  Total NET: -40 ml       CAPILLARY BLOOD GLUCOSE                  Neuro: AAOX3    Lungs: CTA, IS demonstrated    CV:    ABD: soft, nontender    Ext: right knee dsg cdi with scant blood stains contained in aquacell, right LE NVID strength 5/5    LABS                        13.7   10.5  )-----------( 169      ( 05 Mar 2017 08:05 )             39.3                                05 Mar 2017 08:05    139    |  104    |  16     ----------------------------<  103    3.8     |  28     |  0.96     Ca    8.5        05 Mar 2017 08:05        [ ] Other Labs  [ ] None ordered            Please check or [place] when present:  •  Heart Failure:    [ ] Acute        [ ]  Acute on Chronic        [ ] Chronic         [ ] Diastolic     [ ]  Combined    •  MITCH:     [ ] ATN        [ ]  Renal medullary necrosis       [ ]  Renal cortical necrosis                  [ ] Other pathological Lesion:  •  CKD:  [ ] Stage I   [ ] Stage II  [ ] Stage III    [ ]Stage IV   [ ]  CKD V   [ ]  Other/Unspecified:    •  Abdominal Nutritional Status:   [ ] Malnutrition-See Nutrition note    [ ] Cachexia   [ ]  Other        [ ] Supplement ordered:            [ ] Morbid Obesity: BMI >=40         ASSESSMENT/PLAN:      STATUS POST: R TKA    CONTINUE:          [ ] PT- WBAT R LE    [ ] DVT PPX- scd boots, ASA bid    [ ] Pain Mgt- PO meds, added NSAIDs for pain control    [ ] Dispo plan- home vs. [facility] ORTHO NOTE    [ x] Pt seen/examined.  [ ] Pt without any complaints/in NAD.    [x ] Pt complains of: fluctuations in blood pressure and headache      ROS: [ ] Fever  [ ] Chills  [ ] CP [ ] SOB [ ] Dysnea  [ ] Palpitations [ ] Cough [ ] N/V/C/D [ ] Paresthia [ ] Other     [x ] ROS  otherwise negative    .    PHYSICAL EXAM:    Vital Signs Last 24 Hrs  T(C): 36.3, Max: 38.1 (03-05 @ 22:25)  T(F): 97.3, Max: 100.5 (03-05 @ 22:25)  HR: 80 (77 - 88)  BP: 118/77 (118/77 - 169/85)  BP(mean): --  RR: 17 (15 - 17)  SpO2: 96% (94% - 96%)    I&O's Detail  I & Os for 24h ending 06 Mar 2017 07:00  =============================================  IN:    Oral Fluid: 1200 ml    Total IN: 1200 ml  ---------------------------------------------  OUT:    Voided: 2150 ml    Total OUT: 2150 ml  ---------------------------------------------  Total NET: -950 ml    I & Os for current day (as of 06 Mar 2017 11:15)  =============================================  IN:    Oral Fluid: 360 ml    Total IN: 360 ml  ---------------------------------------------  OUT:    Voided: 400 ml    Total OUT: 400 ml  ---------------------------------------------  Total NET: -40 ml       CAPILLARY BLOOD GLUCOSE                  Neuro: AAOX3    Lungs: CTA, IS demonstrated    CV:    ABD: soft, nontender    Ext: right knee dsg cdi with scant blood stains contained in aquacell, right LE NVID strength 5/5    LABS                        13.7   10.5  )-----------( 169      ( 05 Mar 2017 08:05 )             39.3                                05 Mar 2017 08:05    139    |  104    |  16     ----------------------------<  103    3.8     |  28     |  0.96     Ca    8.5        05 Mar 2017 08:05        [ ] Other Labs  [ ] None ordered            Please check or [place] when present:  •  Heart Failure:    [ ] Acute        [ ]  Acute on Chronic        [ ] Chronic         [ ] Diastolic     [ ]  Combined    •  MITCH:     [ ] ATN        [ ]  Renal medullary necrosis       [ ]  Renal cortical necrosis                  [ ] Other pathological Lesion:  •  CKD:  [ ] Stage I   [ ] Stage II  [ ] Stage III    [ ]Stage IV   [ ]  CKD V   [ ]  Other/Unspecified:    •  Abdominal Nutritional Status:   [ ] Malnutrition-See Nutrition note    [ ] Cachexia   [ ]  Other        [ ] Supplement ordered:            [ ] Morbid Obesity: BMI >=40         ASSESSMENT/PLAN:      STATUS POST: R TKA  cardiology c/s for hypertension  Dr. Beckman sent peer to peer information   CONTINUE:          [ ] PT- WBAT R LE    [ ] DVT PPX- scd boots, ASA bid    [ ] Pain Mgt- PO meds, added NSAIDs for pain control    [ ] Dispo plan- home vs. [facility] Finasteride Male Counseling: Finasteride Counseling:  I discussed with the patient the risks of use of finasteride including but not limited to decreased libido, decreased ejaculate volume, gynecomastia, and depression. Women should not handle medication.  All of the patient's questions and concerns were addressed. Finasteride Counseling:  I discussed with the patient the risks of use of finasteride including but not limited to decreased libido, decreased ejaculate volume, gynecomastia, and depression. Women should not handle medication.  All of the patient's questions and concerns were addressed.

## 2025-04-14 NOTE — PHYSICAL THERAPY INITIAL EVALUATION ADULT - WORK/LEISURE ACTIVITY, REHAB EVAL
Render In Strict Bullet Format?: No Plan: Hydrocortisone: Apply to affected areas of eyelid bid for up to 2 weeks repeat for flares. Detail Level: Zone Plan: ketoconazole 2 % topical cream: Apply to aa of rash on hairline qd\\nHCT 2.5% to AA bid prn flares, may use for up to 2wks/mo independent